# Patient Record
Sex: FEMALE
[De-identification: names, ages, dates, MRNs, and addresses within clinical notes are randomized per-mention and may not be internally consistent; named-entity substitution may affect disease eponyms.]

---

## 2023-07-27 ENCOUNTER — APPOINTMENT (OUTPATIENT)
Dept: ORTHOPEDIC SURGERY | Facility: CLINIC | Age: 83
End: 2023-07-27
Payer: MEDICARE

## 2023-07-27 DIAGNOSIS — M65.332 TRIGGER FINGER, LEFT MIDDLE FINGER: ICD-10-CM

## 2023-07-27 DIAGNOSIS — M65.331 TRIGGER FINGER, RIGHT MIDDLE FINGER: ICD-10-CM

## 2023-07-27 DIAGNOSIS — M19.049 PRIMARY OSTEOARTHRITIS, UNSPECIFIED HAND: ICD-10-CM

## 2023-07-27 DIAGNOSIS — M65.341 TRIGGER FINGER, RIGHT RING FINGER: ICD-10-CM

## 2023-07-27 DIAGNOSIS — Z78.9 OTHER SPECIFIED HEALTH STATUS: ICD-10-CM

## 2023-07-27 DIAGNOSIS — Z00.00 ENCOUNTER FOR GENERAL ADULT MEDICAL EXAMINATION W/OUT ABNORMAL FINDINGS: ICD-10-CM

## 2023-07-27 PROCEDURE — 73130 X-RAY EXAM OF HAND: CPT | Mod: 50

## 2023-07-27 PROCEDURE — 99214 OFFICE O/P EST MOD 30 MIN: CPT

## 2023-07-27 NOTE — ASSESSMENT
[FreeTextEntry1] : The condition was explained to the patient.\par \par Discussed that arthritis is a progressive degenerative process, and symptoms may have a waxing/waning course, which may be exacerbated by activity or trauma.\par Discussed increased propensity for stiffness due to underlying arthritis.\par \par Discussed risks and benefits of treatment options for tenosynovitis - activity modification, NSAID, splint, steroid injection, or surgery.\par Patient declined CSI at this time.\par \par - prescribed OT for bilateral hands.\par - recommend Voltaren gel, moist heat, activity modification PRN.\par - recommend padding for cane.\par \par F/u PRN.

## 2023-07-27 NOTE — HISTORY OF PRESENT ILLNESS
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 On the RIGHT, she reports triggering of the middle finger and pain in her ring ongoing for months. On the LEFT, pain in the middle finger, no triggering. Denies injury/trauma/N/T.\par \par Last seen 11/4/20 for bilateral medial epicondylitis.\par Last seen 11/4/20 for bilateral shoulders => referred to Dr. Bloom.\par \par PMHx: GI bleed.\par  [FreeTextEntry1] : King hands  [FreeTextEntry5] : GENI is a [ RHD ] F here today for King hand pain. no prior injury. Pain started couple months ago. Feels pain from king hands to the elbow. Having pain trouble closing her hand. Feels more pain on the RT hand.  Feels pain from the LT middle finger to the palm of her hand. No N/T.

## 2023-07-27 NOTE — IMAGING
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 good finger extension, flex to full fist. good finger abduction and adduction. 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 TTP to MF PIPJ.\par wrist ROM: good extension, flexion. good pronation, supination.\par MF: good ext, flex 1cm to proximal palm.\par other fingers good extension, flex to mid-palm. good EPL, FPL.\par SILT median, ulnar, radial distributions.\par brisk cap refill all digits.\par + locking of MF.\par \par \par XRAYS OF LEFT HAND: no acute displaced fracture or dislocation. 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 well-corticated ossification adjacent to ulnar styloid.\par XRAYS OF RIGHT HAND: no acute displaced fracture or dislocation. 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